# Patient Record
Sex: MALE | Race: WHITE | NOT HISPANIC OR LATINO | URBAN - METROPOLITAN AREA
[De-identification: names, ages, dates, MRNs, and addresses within clinical notes are randomized per-mention and may not be internally consistent; named-entity substitution may affect disease eponyms.]

---

## 2017-02-16 ENCOUNTER — OFFICE VISIT - HEALTHEAST (OUTPATIENT)
Dept: FAMILY MEDICINE | Facility: CLINIC | Age: 62
End: 2017-02-16

## 2017-02-16 DIAGNOSIS — J40 BRONCHITIS: ICD-10-CM

## 2021-05-30 VITALS — WEIGHT: 170 LBS

## 2021-06-08 NOTE — PROGRESS NOTES
HPI: has been off work since the end of Jan for bronchitis.  Was not treated.  Coughing up greenish phlegm.  Yesterday was the first day without night sweats or chills.  A little SOB.  Does not get the flu shot.      GEN:   CV:   PULM:   GI:   :  GYN:     MS:   PSYCH:   DERM:   NEUR:       No current outpatient prescriptions on file prior to visit.     No current facility-administered medications on file prior to visit.        Pmh: reviewed  Psh: reviewed  Allergy:  reviewed      EXAM:    Visit Vitals     /82 (Patient Site: Left Arm, Patient Position: Sitting, Cuff Size: Adult Regular)     Pulse 62     Temp 97.5  F (36.4  C) (Oral)     Resp 10     Wt 170 lb (77.1 kg)     SpO2 98%     GEN:   ALERT, NAD, ORIENTED TIMES THREE  HEENT: TMS NL, PERRL, THR CLEAR  NECK: SUPPLE WITHOUT ADENOPATHY OR THYROMEGALY  LUNGS: CTA  COR: RRR WITHOUT MURMUR      No results found for this or any previous visit (from the past 168 hour(s)).    DIAGNOSIS:  1. Bronchitis         PLAN:  Patient Instructions   Ok to return to work without restriction    Recommend Tdap.  (declined today)    Recommend a screening colonoscopy  (declined today)    Recommend a PSA screen  (declined today)